# Patient Record
(demographics unavailable — no encounter records)

---

## 2024-10-07 NOTE — HISTORY OF PRESENT ILLNESS
[FreeTextEntry1] : 65 yearF here for assessment for Type 2 diabetes mellitus   last A1c: 7.4%   Patient with past medical history as below, remarkable for breast ca, WENDY, stage IV CKD, CHF, vulvar ca s/p vulvectomy and chemo-RT. Hx of renal calculi and hydronephrosis s/p lithotripsy      Screening Ophthalmology: follows Microalbumin: Cr:  follows with nephrology  EGFR: 18     Current diabetic medication regimen (verified with patient):   repaglinide 0.5mg with lunch/ dinner(largest meals) pioglitazone 30mg po daily   Patient was on ozempic, however unable to get due to insurance issues and had to be transitioned to the above      SMBG ranges (glucometer):  av-140 mg/dl whenever checked      Obesity  Unable to get GLP1Ra due to insurance, reports may change insurance in January   Patient is concerned about gradual generalized weight gain.    Follows a special diet: CHO restricted  Food Cravings: No Tried to lose weight before: Yes Tried any diet plans/ meal replacements for weight control: No Tried OTC or prescription medication for weight control: No     Ease of skin bruising: No Proximal muscle weakness: No Prior weight loss surgery: No    Known history of the following:   Thyroid disease: No Heart disease: No Mood disorder: yes Hypertension: No Seizures: No Gall bladder disease: No Known diabetic retinopathy: No  Pancreatitis: No Urolithiasis: yes 
No

## 2024-10-08 NOTE — PHYSICAL EXAM
[Normal Appearance] : normal appearance [Well Groomed] : well groomed [] : no respiratory distress [Skin Color & Pigmentation] : normal skin color and pigmentation [Affect] : the affect was normal [Mood] : the mood was normal

## 2024-10-08 NOTE — ASSESSMENT
[FreeTextEntry1] : changed program to Program 6, amp 1.1, vaginal sensory  will follow up in 2 weeks

## 2024-10-08 NOTE — HISTORY OF PRESENT ILLNESS
[FreeTextEntry1] : 66F with hx of breast cancer, sarcoidosis, CKD stage 4 (most recently 2.95 Nov 2023), kidney stones and Vulvar cancer, s/p surgery, chemo and radiation s/p 1/2024 botox with incomplete bladder emptying, pain with cysto as her urethra needed to be dilated over a wire  S/p SNM on 6/18/24 with early improvement, but more recent resumption of leakage   1 mo follow up Last visit was set to B, amp 1.3 mA, sens perineum Reports about 20% improvement  Today interrogated Set to Program 6, amp 1.1, sensory vaginal    OR 6/18/24 Electrode 0, toes 2.3 , Chaz, 3.0. tlch Electrode 1, toes 1.4, Chaz 1.2. Electrode 2, toes 0.5, Chaz 0.7. Electrode 3, toes 0.3, Chaz 1.0.

## 2024-11-05 NOTE — HISTORY OF PRESENT ILLNESS
[de-identified] : 65 y/o female with a hx of sarcoidosis and breast cancer in 2005 s/p lumpectomy w/ axillary dissection, chemotherapy, RT and tamoxifen/femara, s/p BSO in 2010 with negative pathology presents for a follow up visit.   MMG (2/11/20):  1. No radiographic evidence of malignancy and no significant radiographic change since the prior images.  2. Please see the ultrasound report below for additional information.  US (2/11/20): 1. Newly seen intraductal mass at the retroareolar right breast for which ultrasound-guided core  biopsy is recommended.  2. No sonographic evidence of malignancy on the left.  3. No sonographic finding at the 4:00 left breast where the patient described an area of pain to the  ultrasound technologist. Clinical follow-up is recommended.  BIARDS 4B: Suspicious Finding(s) - Moderate Suspicion for Malignancy  Pathology (2/14/20): Final Diagnosis 1. Right breast retroareolar core biopsy - Fragments of intraductal papilloma  FHx of breast cancer seen in mother  Medical Hx: DM, vulva cancer, oophorectomy, kidney stones, vulvectomy - tx under Dr. Multani  8/5/2020: Patient presents to the office for post-op Breast Exam S/P  Right breast lumpectomy on 7/23/20.  Pathology revealed  Intraductal papilloma with usual ductal hyperplasia reported on 7/28/20.   Patient had an  MR BREAST WAWIC BI W CAD On 7/31/20-, Post-op  Finding as follow: 1.  Status post excision of a papilloma in the right breast, with postsurgical change, as above. 2.  Stable 3 mm circumscribed T2 hyperintense enhancing focus in the upper outer right breast dating back to 7/13/2019. Breast MRI is recommended in one year to ensure continued stability of this finding. 3.  Status post left lumpectomy. No suspicious enhancement in the left breast. BI-RADS 3   ER/[place] (+), HER 2- (-),  poorly differentiated left breast cancer in 2005. She is s/p lumpectomy w/ axillary dissection, chemotherapy, RT and tamoxifen/femara, s/p BSO in 2010 with negative pathology.  S/P right breast lumpectomy on 7/23/20.  Pathology revealed Intraductal papilloma with usual ductal hyperplasia reported on 7/28/20.  MMG/US on 6/6/22-  No evidence of malignancy. BI-RADS 2  PET 10/6/22- Minimally FDG-avid asymmetric 1 cm soft tissue density, inferior RIGHT breast, not significantly changed in size and demonstrating new, minimal FDG avidity, and mildly FDG avid LEFT breast skin thickening, new since 2/3/2020, exam.are nonspecific.  S/p left breast lumpectomy on 1/12/23 pathology reveals DCIS, negative margins.  Most recent MMG/US on 6/10/2024 shows dense breasts, no evidence of malignancy. BI-RADS 2

## 2024-11-05 NOTE — HISTORY OF PRESENT ILLNESS
[de-identified] : 65 y/o female with a hx of sarcoidosis and breast cancer in 2005 s/p lumpectomy w/ axillary dissection, chemotherapy, RT and tamoxifen/femara, s/p BSO in 2010 with negative pathology presents for a follow up visit.   MMG (2/11/20):  1. No radiographic evidence of malignancy and no significant radiographic change since the prior images.  2. Please see the ultrasound report below for additional information.  US (2/11/20): 1. Newly seen intraductal mass at the retroareolar right breast for which ultrasound-guided core  biopsy is recommended.  2. No sonographic evidence of malignancy on the left.  3. No sonographic finding at the 4:00 left breast where the patient described an area of pain to the  ultrasound technologist. Clinical follow-up is recommended.  BIARDS 4B: Suspicious Finding(s) - Moderate Suspicion for Malignancy  Pathology (2/14/20): Final Diagnosis 1. Right breast retroareolar core biopsy - Fragments of intraductal papilloma  FHx of breast cancer seen in mother  Medical Hx: DM, vulva cancer, oophorectomy, kidney stones, vulvectomy - tx under Dr. Multani  8/5/2020: Patient presents to the office for post-op Breast Exam S/P  Right breast lumpectomy on 7/23/20.  Pathology revealed  Intraductal papilloma with usual ductal hyperplasia reported on 7/28/20.   Patient had an  MR BREAST WAWIC BI W CAD On 7/31/20-, Post-op  Finding as follow: 1.  Status post excision of a papilloma in the right breast, with postsurgical change, as above. 2.  Stable 3 mm circumscribed T2 hyperintense enhancing focus in the upper outer right breast dating back to 7/13/2019. Breast MRI is recommended in one year to ensure continued stability of this finding. 3.  Status post left lumpectomy. No suspicious enhancement in the left breast. BI-RADS 3   ER/[place] (+), HER 2- (-),  poorly differentiated left breast cancer in 2005. She is s/p lumpectomy w/ axillary dissection, chemotherapy, RT and tamoxifen/femara, s/p BSO in 2010 with negative pathology.  S/P right breast lumpectomy on 7/23/20.  Pathology revealed Intraductal papilloma with usual ductal hyperplasia reported on 7/28/20.  MMG/US on 6/6/22-  No evidence of malignancy. BI-RADS 2  PET 10/6/22- Minimally FDG-avid asymmetric 1 cm soft tissue density, inferior RIGHT breast, not significantly changed in size and demonstrating new, minimal FDG avidity, and mildly FDG avid LEFT breast skin thickening, new since 2/3/2020, exam.are nonspecific.  S/p left breast lumpectomy on 1/12/23 pathology reveals DCIS, negative margins.  Most recent MMG/US on 6/10/2024 shows dense breasts, no evidence of malignancy. BI-RADS 2

## 2024-11-05 NOTE — PHYSICAL EXAM
[Normal] : supple, no neck mass and thyroid not enlarged [Normal Neck Lymph Nodes] : normal neck lymph nodes  [Normal Supraclavicular Lymph Nodes] : normal supraclavicular lymph nodes [Normal Axillary Lymph Nodes] : normal axillary lymph nodes [Normal] : oriented to person, place and time, with appropriate affect [FreeTextEntry1] : SC was present [de-identified] : Normal S1,S2. Regular rate and rhythm  [de-identified] : Complete breast exam performed in supine and upright position. No palpable masses, tenderness, nipple discharge, inversion, deviation or enlarged axillary or supraclavicular lymph nodes bilaterally. Right breast chronic seroma. [de-identified] : Clear breath sounds bilaterally with normal respiratory effort.

## 2024-11-05 NOTE — PHYSICAL EXAM
[Normal] : supple, no neck mass and thyroid not enlarged [Normal Neck Lymph Nodes] : normal neck lymph nodes  [Normal Supraclavicular Lymph Nodes] : normal supraclavicular lymph nodes [Normal Axillary Lymph Nodes] : normal axillary lymph nodes [Normal] : oriented to person, place and time, with appropriate affect [FreeTextEntry1] : SC was present [de-identified] : Normal S1,S2. Regular rate and rhythm  [de-identified] : Complete breast exam performed in supine and upright position. No palpable masses, tenderness, nipple discharge, inversion, deviation or enlarged axillary or supraclavicular lymph nodes bilaterally. Right breast chronic seroma. [de-identified] : Clear breath sounds bilaterally with normal respiratory effort.

## 2024-11-05 NOTE — ASSESSMENT
[FreeTextEntry1] : Imp: S/P Right breast lumpectomy for Intraductal papilloma with usual ductal hyperplasia. No evidence of new or recurrent lesions. Breast imaging failed to identify any suspicious lesions on MR on 7/31/20 and showed continued stability of enhancing focus in the right breast for which continued follow up is advised.  [id number]/[id number] 2 PET 10/6/22- Minimally FDG-avid asymmetric 1 cm soft tissue density, inferior RIGHT breast, not significantly changed in size and demonstrating new, minimal FDG avidity, and mildly FDG avid LEFT breast skin thickening, new since 2/3/2020, exam.are nonspecific.  S/p left breast lumpectomy on 1/12/23 pathology reveals DCIS, negative margins.  MMG/US 6/2023- BI-RADS 2  Plan: MMG/US June 2024- ordered Return in 1 year   All medical entries were at my, Dr. Bertram Mancilla, direction. I have reviewed the chart and agree that the record accurately reflects my personal performance of the history, physical exam, assessment and plan. Our office Nurse Practitioner was present of the duration of the office visit.

## 2024-11-07 NOTE — PHYSICAL EXAM
[Normal] : well developed, well nourished, in no acute distress [] : no respiratory distress [Respiration, Rhythm And Depth] : normal respiratory rhythm and effort [Abdomen Soft] : soft [No Focal Deficits] : no focal deficits [Oriented To Time, Place, And Person] : oriented to person, place, and time [Affect] : the affect was normal

## 2024-11-07 NOTE — HISTORY OF PRESENT ILLNESS
[FreeTextEntry1] : Ms. Shelton is a 66-year-old woman seen today for follow-up visit. She was treated for [id number] vulvar cancer s/p partial radical vulvectomy, LVSI+, DOI 1.0 cm. 1 mm yanci-medial margin, 7 mm deep margin, and 1/10 inguinal LNs (+) 4 mm with TOMY, with local recurrence prior to RT, and then s/p 5940 RT to vulva and groins completed in January 2019 with clitoral lesions persisting through treatment, and subsequent radical anterior vulvectomy with primary closure 4/22/19.   She also has a history of high-grade DCIS of the left breast s/p lumpectomy in January 2023, DCISRT 9.2 with antecedent history of ipsilateral ER/[place] (+), HER 2- (-), poorly differentiated left breast cancer treated with Breast conserving surgery w/ axillary dissection, adjuvant chemotherapy and radiation therapy in 2005 followed by tamoxifen/femara. S/p BSO in 2010 with negative pathology.   4/17/24: She presents today for follow-up. She has a persistent cough, following with pulmonary and ENT. No breast pain. Denies abdominal or pelvic pain, diarrhea or constipation, vaginal bleeding or discharge. Main complaint is persistent urinary leakage, for which she wears Depends. No dysuria. She saw urology Dr Dumont and had recent cystoscopy.   5/8/2024: MRI pelvis: mixed T2 hypointense and intermediate signal of the left vulvar region with progressive enhancement, suggestive of posttreatment changes. In addition, there is an indeterminant T2 signal lesion distal to the vaginal introitus and near the distal urethra without diffusion restriction or abnormal enhancement. Findings felt to represent posttreatment changes.   6/3/2024- MRI Brain, MRA brain and neck for persistent headaches- no evidence of acute infarct, large vessel occlusion, or aneurysm. No hemodynamically significant stenosis of the bilateral cervical ICAs by NASCET criteria.   6/10/24 Mammo/sono: No evidence of malignancy. Bi-Rads 2.   11/7/24: She presents today for follow-up. Continues following with Dr. Mancilla, saw him earlier this week. She reports feeling a persistent lump in the left breast, unchanged for years, likely scar tissue. Next mammo/sono in June.  She also follows with Dr. Dumont for urinary incontinence, now with sacral neuromodulation implant device, which is helping somewhat. Still with urinary leakage, wears Depends.  No abdominal or pelvic pain, no vaginal bleeding, no diarrhea or constipation. Has not seen Dr. Gordillo.

## 2024-11-07 NOTE — HISTORY OF PRESENT ILLNESS
[FreeTextEntry1] : Ms. Shelton is a 66-year-old woman seen today for follow-up visit. She was treated for [id number] vulvar cancer s/p partial radical vulvectomy, LVSI+, DOI 1.0 cm. 1 mm yanci-medial margin, 7 mm deep margin, and 1/10 inguinal LNs (+) 4 mm with TOMY, with local recurrence prior to RT, and then s/p 5940 RT to vulva and groins completed in January 2019 with clitoral lesions persisting through treatment, and subsequent radical anterior vulvectomy with primary closure 4/22/19.   She also has a history of high-grade DCIS of the left breast s/p lumpectomy in January 2023, DCISRT 9.2 with antecedent history of ipsilateral ER/[place] (+), HER 2- (-), poorly differentiated left breast cancer treated with Breast conserving surgery w/ axillary dissection, adjuvant chemotherapy and radiation therapy in 2005 followed by tamoxifen/femara. S/p BSO in 2010 with negative pathology.   4/17/24: She presents today for follow-up. She has a persistent cough, following with pulmonary and ENT. No breast pain. Denies abdominal or pelvic pain, diarrhea or constipation, vaginal bleeding or discharge. Main complaint is persistent urinary leakage, for which she wears Depends. No dysuria. She saw urology Dr Dumont and had recent cystoscopy.   5/8/2024: MRI pelvis: mixed T2 hypointense and intermediate signal of the left vulvar region with progressive enhancement, suggestive of posttreatment changes. In addition, there is an indeterminant T2 signal lesion distal to the vaginal introitus and near the distal urethra without diffusion restriction or abnormal enhancement. Findings felt to represent posttreatment changes.   6/3/2024- MRI Brain, MRA brain and neck for persistent headaches- no evidence of acute infarct, large vessel occlusion, or aneurysm. No hemodynamically significant stenosis of the bilateral cervical ICAs by NASCET criteria.   6/10/24 Mammo/sono: No evidence of malignancy. Bi-Rads 2.   11/7/24: She presents today for follow-up. Continues following with Dr. Mancilla, saw him earlier this week. She reports feeling a persistent lump in the left breast, unchanged for years, likely scar tissue. Next mammo/sono in June.  She also follows with Dr. Dumont for urinary incontinence, now with sacral neuromodulation implant device, which is helping somewhat. Still with urinary leakage, wears Depends.  No abdominal or pelvic pain, no vaginal bleeding, no diarrhea or constipation. Has not seen Dr. Gordillo.

## 2024-11-07 NOTE — REVIEW OF SYSTEMS
[Constipation: Grade 0] : Constipation: Grade 0 [Diarrhea: Grade 0] : Diarrhea: Grade 0 [Nausea: Grade 0] : Nausea: Grade 0 [Fatigue: Grade 1 - Fatigue relieved by rest] : Fatigue: Grade 1 - Fatigue relieved by rest [Hematuria: Grade 0] : Hematuria: Grade 0 [Urinary Incontinence: Grade 3 - Intervention indicated (e.g., clamp, collagen injections); operative intervention indicated; limiting self care ADL] : Urinary Incontinence: Grade 3 - Intervention indicated (e.g., clamp, collagen injections); operative intervention indicated; limiting self care ADL [Urinary Retention: Grade 0] : Urinary Retention: Grade 0 [Urinary Tract Pain: Grade 0] : Urinary Tract Pain: Grade 0 [Breast Pain: Grade 0] : Breast Pain: Grade 0

## 2025-01-27 NOTE — HISTORY OF PRESENT ILLNESS
[FreeTextEntry1] : f/up for Type 2 diabetes mellitus   last A1c: 5.7%   Patient with past medical history as below, remarkable for breast ca, WENDY, stage IV CKD, CHF, vulvar ca s/p vulvectomy and chemo-RT. Hx of renal calculi and hydronephrosis s/p lithotripsy      Screening Ophthalmology: follows Microalbumin: Cr:  follows with nephrology  EGFR: 18     Current diabetic medication regimen (verified with patient):   repaglinide 0.5mg with lunch/ dinner(largest meals) pioglitazone 30mg po daily    Patient was on ozempic, however unable to get due to insurance issues and had to be transitioned to the above. Currently requesting ozempic again     SMBG ranges (glucometer): Needs testing supplies, reports insurance and coverage issues      Obesity  Unable to get GLP1Ra due to insurance prior, reports insurance changed and is requesting ozempic   Patient is concerned about gradual generalized weight gain.    Follows a special diet: CHO restricted  Food Cravings: No Tried to lose weight before: Yes Tried any diet plans/ meal replacements for weight control: No Tried OTC or prescription medication for weight control: No     Ease of skin bruising: No Proximal muscle weakness: No Prior weight loss surgery: No    Known history of the following:   Thyroid disease: No Heart disease: No Mood disorder: yes Hypertension: No Seizures: No Gall bladder disease: No Known diabetic retinopathy: No  Pancreatitis: No Urolithiasis: yes

## 2025-02-06 NOTE — HISTORY OF PRESENT ILLNESS
[FreeTextEntry1] : Ms. Shelton is a 67 y/o female with PMhx DM, hyperlipidemia, breast cancer, sarcoidosis, vulvar carcinoma and CKD4 presenting today for follow up.   She was diagnosed with vulvar Ca in Sep /2018 and underwent a partial radical vulvectomy in October. She received 6 cycles of chemo ( Cisplatin) and 37 cycles of radiation in Dec/early Jan. Underwent radical vulvectomy with closure in april 2019. Also, with H/O BRCA1+ ER/[place] (+), HER 2- (-) poorly differentiated left breast cancer in 2005, s/p lumpectomy w/ axillary dissection, chemotherapy, RT and tamoxifen/femara, s/p BSO in 2010 with negative pathology.    Early in 2020, she had JONI in the setting of a stone and hydronephrosis. She underwent Lithotripsy with return of her creatinine to the 2 mg/dl range. She was admitted to [facility] in June of 2020 with pyelopnephriItis( E Coli) , JONI and severe anemia. Her peak creatinine during this admission was 4.5 mg/dl and was down to 2.5 mg/dl at the time of discharge. Of note, a ct scan done during this time revealed an atrophic Left kidney.  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 No further treatment ie lumpectomy/radiation etc have been pursued so far.   She was hospitalized in February (2023)for a nervous breakdown due to her cancer diagnosis. Her serum creatinine during this time ranged between 2.6-3.3 mg/dl. Readmitted again with SOB attributed to volume overload, which improved with diuresis. She underwent a CT chest which revealed pulmonary edema and trace bilateral pleural effusions. She also underwent a repeat TTE which showed moderate  diastolic dysfunction ( stage 11). Normal LV/RV  systolic function , EF 56%. DC on home O2. . Her kidney function during this time fluctuated. Serum creatinine in march has ranged between 3.1-3.8 mg/dl.   She underwent a renal sono in the hospital: Right kidney: 9.6 cm. No renal mass, hydronephrosis or calculi. Left kidney: 8.6 cm. No renal mass, hydronephrosis or calculi., Urinary bladder: Within normal limits.  6/2024: She comes for a follow up visit today. Bladder botox not helpful, still with urinary incontinence. Now taking diuretics BID, which has improved her LE edema. BP has been good. Still with postural dizziness. Planning for a urologic procedure next week for incontinence. Was canceled prior due to hyperglycemia. Saw endocrine who started her on Ozempic and Januvia (stated Tradjenta was too expensive). Did lose some weight with ozempic. Last A1c ~8%. Obtained cardiology clearance as well, had 2D echo in May that was normal. sees a psychiatrist once a month and therapy 2 times a week   9/18/24: Presents for follow-up with her . Lost insurance drug coverage so has stopped taking Ozempic for the past couple months and also decreased Ethacrynic acid to once daily to ration her supply. She stated she is otherwise doing well. Had procedure for urinary incontinence which has helped a bit.  2/2025: Since last visit: She has been doing well, denies acute illness or hospitalizations  Urinary incontinence has improved with implantable device  Stopped ethacrynic acid Home -130s - she has been taking torsemide 20, amlodipine 10, propranolol, tamsulosin (?) She has BLE edema, denies dyspnea, orthopnea, abd pain, n/v/d, dysuria, hematuria, rash; continues to have episodes of dizziness Ozempic - stopped while sorting out insurance coverage  On pioglitazone

## 2025-02-06 NOTE — HISTORY OF PRESENT ILLNESS
[FreeTextEntry1] : Ms. Shelton is a 65 y/o female with PMhx DM, hyperlipidemia, breast cancer, sarcoidosis, vulvar carcinoma and CKD4 presenting today for follow up.   She was diagnosed with vulvar Ca in Sep /2018 and underwent a partial radical vulvectomy in October. She received 6 cycles of chemo ( Cisplatin) and 37 cycles of radiation in Dec/early Jan. Underwent radical vulvectomy with closure in april 2019. Also, with H/O BRCA1+ ER/CT (+), HER 2- (-) poorly differentiated left breast cancer in 2005, s/p lumpectomy w/ axillary dissection, chemotherapy, RT and tamoxifen/femara, s/p BSO in 2010 with negative pathology.    Early in 2020, she had JONI in the setting of a stone and hydronephrosis. She underwent Lithotripsy with return of her creatinine to the 2 mg/dl range. She was admitted to [facility] in June of 2020 with pyelopnephriItis( E Coli) , JONI and severe anemia. Her peak creatinine during this admission was 4.5 mg/dl and was down to 2.5 mg/dl at the time of discharge. Of note, a ct scan done during this time revealed an atrophic Left kidney.  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 No further treatment ie lumpectomy/radiation etc have been pursued so far.   She was hospitalized in February (2023)for a nervous breakdown due to her cancer diagnosis. Her serum creatinine during this time ranged between 2.6-3.3 mg/dl. Readmitted again with SOB attributed to volume overload, which improved with diuresis. She underwent a CT chest which revealed pulmonary edema and trace bilateral pleural effusions. She also underwent a repeat TTE which showed moderate  diastolic dysfunction ( stage 11). Normal LV/RV  systolic function , EF 56%. DC on home O2. . Her kidney function during this time fluctuated. Serum creatinine in march has ranged between 3.1-3.8 mg/dl.   She underwent a renal sono in the hospital: Right kidney: 9.6 cm. No renal mass, hydronephrosis or calculi. Left kidney: 8.6 cm. No renal mass, hydronephrosis or calculi., Urinary bladder: Within normal limits.  6/2024: She comes for a follow up visit today. Bladder botox not helpful, still with urinary incontinence. Now taking diuretics BID, which has improved her LE edema. BP has been good. Still with postural dizziness. Planning for a urologic procedure next week for incontinence. Was canceled prior due to hyperglycemia. Saw endocrine who started her on Ozempic and Januvia (stated Tradjenta was too expensive). Did lose some weight with ozempic. Last A1c ~8%. Obtained cardiology clearance as well, had 2D echo in May that was normal. sees a psychiatrist once a month and therapy 2 times a week   9/18/24: Presents for follow-up with her . Lost insurance drug coverage so has stopped taking Ozempic for the past couple months and also decreased Ethacrynic acid to once daily to ration her supply. She stated she is otherwise doing well. Had procedure for urinary incontinence which has helped a bit.  2/2025: Since last visit: She has been doing well, denies acute illness or hospitalizations  Urinary incontinence has improved with implantable device  Stopped ethacrynic acid Home -130s - she has been taking torsemide 20, amlodipine 10, propranolol, tamsulosin (?) She has BLE edema, denies dyspnea, orthopnea, abd pain, n/v/d, dysuria, hematuria, rash; continues to have episodes of dizziness Ozempic - stopped while sorting out insurance coverage  On pioglitazone

## 2025-02-06 NOTE — HISTORY OF PRESENT ILLNESS
[FreeTextEntry1] : Ms. Shelton is a 65 y/o female with PMhx DM, hyperlipidemia, breast cancer, sarcoidosis, vulvar carcinoma and CKD4 presenting today for follow up.   She was diagnosed with vulvar Ca in Sep /2018 and underwent a partial radical vulvectomy in October. She received 6 cycles of chemo ( Cisplatin) and 37 cycles of radiation in Dec/early Jan. Underwent radical vulvectomy with closure in april 2019. Also, with H/O BRCA1+ ER/NV (+), HER 2- (-) poorly differentiated left breast cancer in 2005, s/p lumpectomy w/ axillary dissection, chemotherapy, RT and tamoxifen/femara, s/p BSO in 2010 with negative pathology.    Early in 2020, she had JONI in the setting of a stone and hydronephrosis. She underwent Lithotripsy with return of her creatinine to the 2 mg/dl range. She was admitted to [facility] in June of 2020 with pyelopnephriItis( E Coli) , JONI and severe anemia. Her peak creatinine during this admission was 4.5 mg/dl and was down to 2.5 mg/dl at the time of discharge. Of note, a ct scan done during this time revealed an atrophic Left kidney.  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 No further treatment ie lumpectomy/radiation etc have been pursued so far.   She was hospitalized in February (2023)for a nervous breakdown due to her cancer diagnosis. Her serum creatinine during this time ranged between 2.6-3.3 mg/dl. Readmitted again with SOB attributed to volume overload, which improved with diuresis. She underwent a CT chest which revealed pulmonary edema and trace bilateral pleural effusions. She also underwent a repeat TTE which showed moderate  diastolic dysfunction ( stage 11). Normal LV/RV  systolic function , EF 56%. DC on home O2. . Her kidney function during this time fluctuated. Serum creatinine in march has ranged between 3.1-3.8 mg/dl.   She underwent a renal sono in the hospital: Right kidney: 9.6 cm. No renal mass, hydronephrosis or calculi. Left kidney: 8.6 cm. No renal mass, hydronephrosis or calculi., Urinary bladder: Within normal limits.  6/2024: She comes for a follow up visit today. Bladder botox not helpful, still with urinary incontinence. Now taking diuretics BID, which has improved her LE edema. BP has been good. Still with postural dizziness. Planning for a urologic procedure next week for incontinence. Was canceled prior due to hyperglycemia. Saw endocrine who started her on Ozempic and Januvia (stated Tradjenta was too expensive). Did lose some weight with ozempic. Last A1c ~8%. Obtained cardiology clearance as well, had 2D echo in May that was normal. sees a psychiatrist once a month and therapy 2 times a week   9/18/24: Presents for follow-up with her . Lost insurance drug coverage so has stopped taking Ozempic for the past couple months and also decreased Ethacrynic acid to once daily to ration her supply. She stated she is otherwise doing well. Had procedure for urinary incontinence which has helped a bit.  2/2025: Since last visit: She has been doing well, denies acute illness or hospitalizations  Urinary incontinence has improved with implantable device  Stopped ethacrynic acid Home -130s - she has been taking torsemide 20, amlodipine 10, propranolol, tamsulosin (?) She has BLE edema, denies dyspnea, orthopnea, abd pain, n/v/d, dysuria, hematuria, rash; continues to have episodes of dizziness Ozempic - stopped while sorting out insurance coverage  On pioglitazone

## 2025-02-06 NOTE — HISTORY OF PRESENT ILLNESS
[FreeTextEntry1] : Ms. Shelton is a 65 y/o female with PMhx DM, hyperlipidemia, breast cancer, sarcoidosis, vulvar carcinoma and CKD4 presenting today for follow up.   She was diagnosed with vulvar Ca in Sep /2018 and underwent a partial radical vulvectomy in October. She received 6 cycles of chemo ( Cisplatin) and 37 cycles of radiation in Dec/early Jan. Underwent radical vulvectomy with closure in april 2019. Also, with H/O BRCA1+ ER/KS (+), HER 2- (-) poorly differentiated left breast cancer in 2005, s/p lumpectomy w/ axillary dissection, chemotherapy, RT and tamoxifen/femara, s/p BSO in 2010 with negative pathology.    Early in 2020, she had JONI in the setting of a stone and hydronephrosis. She underwent Lithotripsy with return of her creatinine to the 2 mg/dl range. She was admitted to [facility] in June of 2020 with pyelopnephriItis( E Coli) , JONI and severe anemia. Her peak creatinine during this admission was 4.5 mg/dl and was down to 2.5 mg/dl at the time of discharge. Of note, a ct scan done during this time revealed an atrophic Left kidney.  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 No further treatment ie lumpectomy/radiation etc have been pursued so far.   She was hospitalized in February (2023)for a nervous breakdown due to her cancer diagnosis. Her serum creatinine during this time ranged between 2.6-3.3 mg/dl. Readmitted again with SOB attributed to volume overload, which improved with diuresis. She underwent a CT chest which revealed pulmonary edema and trace bilateral pleural effusions. She also underwent a repeat TTE which showed moderate  diastolic dysfunction ( stage 11). Normal LV/RV  systolic function , EF 56%. DC on home O2. . Her kidney function during this time fluctuated. Serum creatinine in march has ranged between 3.1-3.8 mg/dl.   She underwent a renal sono in the hospital: Right kidney: 9.6 cm. No renal mass, hydronephrosis or calculi. Left kidney: 8.6 cm. No renal mass, hydronephrosis or calculi., Urinary bladder: Within normal limits.  6/2024: She comes for a follow up visit today. Bladder botox not helpful, still with urinary incontinence. Now taking diuretics BID, which has improved her LE edema. BP has been good. Still with postural dizziness. Planning for a urologic procedure next week for incontinence. Was canceled prior due to hyperglycemia. Saw endocrine who started her on Ozempic and Januvia (stated Tradjenta was too expensive). Did lose some weight with ozempic. Last A1c ~8%. Obtained cardiology clearance as well, had 2D echo in May that was normal. sees a psychiatrist once a month and therapy 2 times a week   9/18/24: Presents for follow-up with her . Lost insurance drug coverage so has stopped taking Ozempic for the past couple months and also decreased Ethacrynic acid to once daily to ration her supply. She stated she is otherwise doing well. Had procedure for urinary incontinence which has helped a bit.  2/2025: Since last visit: She has been doing well, denies acute illness or hospitalizations  Urinary incontinence has improved with implantable device  Stopped ethacrynic acid Home -130s - she has been taking torsemide 20, amlodipine 10, propranolol, tamsulosin (?) She has BLE edema, denies dyspnea, orthopnea, abd pain, n/v/d, dysuria, hematuria, rash; continues to have episodes of dizziness Ozempic - stopped while sorting out insurance coverage  On pioglitazone

## 2025-02-06 NOTE — ASSESSMENT
[FreeTextEntry1] : Ms Shelton is a 67 y/o woman with remote H/O sarcoidosis, breast cancer, diagnosed Vulvar cancer in 2018 s/p vulvectomy/Chemotherapy ( Cisplatin) /Radiation, recently diagnosed left Breast Ca /2023( DCIS) s/p lumpectomy, Diastolic dysfunction, DM and CKD here for follow up  1. CKD: Stage 4 CKD with superimposed JONI with left atrophic kidney. Her CKD is likely as a result of tubular toxicity from cisplatin that she received in 2019, with possibly a component from NSAIDS. Sarcoidosis can also lead to chronic tubulointerstitial nephritis however it seems less likely as she is in remission from Sarcoid. US duplex negative for CELI. She had a few hospitalizations in 2023 with fluid overload requiring diuresis, found to have diastolic dysfunction but most recent echo in May was normal. Cr had increased to 3.4 in 5/2024, unclear etiology, but has since settled back at her baseline ~ 2.8 (9/2024). - Repeat renal labs, UA, uPCR - Check CBC, PTH, vit D, phos - Knows to avoid NSAIDs -Check renal sono  2. HTN: BP 140s is suboptimal but acceptable for now as home BP is 130-140s. We think she is taking torsemide, not ethacrynic acid, as well as amlodipine 10, propranolol. - I have asked her to bring a list of her active meds or med bottles with her to clinic visits as she does not fully remember what she takes  - continue with torsemide, amlodipine for now - advised low sodium diet -Will d/w Endocrine re pioglitazone  3. Anemia: Hgb 11.4 is at goal, iron stores are replete. - Continue to monitor Hgb and iron stores periodically.   4. BMD: Historically with secondary hyperparathyroidism, Vit d replete - Maintain calcitriol 0.25 mcg 5 days a week -repeat labs  RTC in 4 months.

## 2025-02-06 NOTE — REVIEW OF SYSTEMS
[Recent Weight Gain (___ Lbs)] : recent [unfilled] ~Ulb weight gain [Lower Ext Edema] : lower extremity edema [Incontinence] : incontinence [Anxiety] : anxiety [Negative] : Neurological [Dizziness] : dizziness [Fever] : no fever [Chills] : no chills [Feeling Poorly] : not feeling poorly [Feeling Tired] : not feeling tired [Heart Rate Is Slow] : the heart rate was not slow [Heart Rate Is Fast] : the heart rate was not fast [Feelings Of Weakness] : no feelings of weakness [Easy Bleeding] : no tendency for easy bleeding [Easy Bruising] : no tendency for easy bruising [FreeTextEntry3] : allergies [FreeTextEntry8] : Improved

## 2025-02-06 NOTE — PHYSICAL EXAM
[General Appearance - Alert] : alert [General Appearance - In No Acute Distress] : in no acute distress [General Appearance - Well Nourished] : well nourished [Neck Cervical Mass (___cm)] : no neck mass was observed [Jugular Venous Distention Increased] : there was no jugular-venous distention [Respiration, Rhythm And Depth] : normal respiratory rhythm and effort [] : no respiratory distress [Exaggerated Use Of Accessory Muscles For Inspiration] : no accessory muscle use [Auscultation Breath Sounds / Voice Sounds] : lungs were clear to auscultation bilaterally [Heart Rate And Rhythm] : heart rate was normal and rhythm regular [Heart Sounds] : normal S1 and S2 [Heart Sounds Gallop] : no gallops [Murmurs] : no murmurs [Heart Sounds Pericardial Friction Rub] : no pericardial rub [Abdomen Soft] : soft [Abdomen Tenderness] : non-tender [No CVA Tenderness] : no ~M costovertebral angle tenderness [Abnormal Walk] : normal gait [Involuntary Movements] : no involuntary movements were seen [Skin Color & Pigmentation] : normal skin color and pigmentation [No Focal Deficits] : no focal deficits [Oriented To Time, Place, And Person] : oriented to person, place, and time [Impaired Insight] : insight and judgment were intact [Affect] : the affect was normal [Mood] : the mood was normal [FreeTextEntry1] : ++edema 2+ BLE edema

## 2025-02-06 NOTE — END OF VISIT
[] : Fellow [Time Spent: ___ minutes] : I have spent [unfilled] minutes of time on the encounter which excludes teaching and separately reported services. [FreeTextEntry3] : Per Dr. Pandey's note

## 2025-02-06 NOTE — PHYSICAL EXAM
[General Appearance - Alert] : alert [General Appearance - In No Acute Distress] : in no acute distress [General Appearance - Well Nourished] : well nourished [Neck Cervical Mass (___cm)] : no neck mass was observed [Jugular Venous Distention Increased] : there was no jugular-venous distention [] : no respiratory distress [Respiration, Rhythm And Depth] : normal respiratory rhythm and effort [Exaggerated Use Of Accessory Muscles For Inspiration] : no accessory muscle use [Auscultation Breath Sounds / Voice Sounds] : lungs were clear to auscultation bilaterally [Heart Rate And Rhythm] : heart rate was normal and rhythm regular [Heart Sounds] : normal S1 and S2 [Heart Sounds Gallop] : no gallops [Murmurs] : no murmurs [Heart Sounds Pericardial Friction Rub] : no pericardial rub [Abdomen Soft] : soft [Abdomen Tenderness] : non-tender [No CVA Tenderness] : no ~M costovertebral angle tenderness [Abnormal Walk] : normal gait [Involuntary Movements] : no involuntary movements were seen [Skin Color & Pigmentation] : normal skin color and pigmentation [No Focal Deficits] : no focal deficits [Oriented To Time, Place, And Person] : oriented to person, place, and time [Impaired Insight] : insight and judgment were intact [Affect] : the affect was normal [Mood] : the mood was normal [FreeTextEntry1] : ++edema 2+ BLE edema

## 2025-02-06 NOTE — ASSESSMENT
Derek 28.1    +FM - especially on the right side  No lof, no vb  No ctx       EDC by 8w0d US   - NIPT & Carrier discussed- declined  - msAFP: declined  - L2US nl   - CBC, 1 hour GTT, syphillis and HIV testing all ordered at prior appt   -advise her to have 1 hr GTT & CBC done between 24-28 wks and for 3rd tri HIV & T pal done at 28 wks. - plans to do it this week   -tdap discussed - ask at next visit      Obesity (pre preg BMI 33)   -nl L2US  - follow up growth w BPP at 32 weeks - 2025  -A1c: 5.6 on , CMP normal   -limit wt gain 11-20 lbs      AMA at time of delivery  - see  testing above      H/o oligohydramnios   -IOL at 41 wks for low fluid   -she was scared of IOL, wanted to wait as long as possible.      PFPT and low back pain  [ ] pelvic PT order placed    Cconstipation   Lifestyle recs given  Miralax   Rectal suppository to pharmacy    [FreeTextEntry1] : Ms Shelton is a 67 y/o woman with remote H/O sarcoidosis, breast cancer, diagnosed Vulvar cancer in 2018 s/p vulvectomy/Chemotherapy ( Cisplatin) /Radiation, recently diagnosed left Breast Ca /2023( DCIS) s/p lumpectomy, Diastolic dysfunction, DM and CKD here for follow up  1. CKD: Stage 4 CKD with superimposed JONI with left atrophic kidney. Her CKD is likely as a result of tubular toxicity from cisplatin that she received in 2019, with possibly a component from NSAIDS. Sarcoidosis can also lead to chronic tubulointerstitial nephritis however it seems less likely as she is in remission from Sarcoid. US duplex negative for CELI. She had a few hospitalizations in 2023 with fluid overload requiring diuresis, found to have diastolic dysfunction but most recent echo in May was normal. Cr had increased to 3.4 in 5/2024, unclear etiology, but has since settled back at her baseline ~ 2.8 (9/2024). - Repeat renal labs, UA, uPCR - Check CBC, PTH, vit D, phos - Knows to avoid NSAIDs -Check renal sono  2. HTN: BP 140s is suboptimal but acceptable for now as home BP is 130-140s. We think she is taking torsemide, not ethacrynic acid, as well as amlodipine 10, propranolol. - I have asked her to bring a list of her active meds or med bottles with her to clinic visits as she does not fully remember what she takes  - continue with torsemide, amlodipine for now - advised low sodium diet -Will d/w Endocrine re pioglitazone  3. Anemia: Hgb 11.4 is at goal, iron stores are replete. - Continue to monitor Hgb and iron stores periodically.   4. BMD: Historically with secondary hyperparathyroidism, Vit d replete - Maintain calcitriol 0.25 mcg 5 days a week -repeat labs  RTC in 4 months.

## 2025-03-12 NOTE — HISTORY OF PRESENT ILLNESS
[FreeTextEntry1] : f/u chronic issues [de-identified] : 1) saw nephro, got u/s of kidneys   2) had some vision disturbances- called neuro, got MRI, pending apptment with movement disorder specialist, no further episoides   3) still having headaches- coffee helps, soda helps  4) getting dizziness when she stands up, otherwise okay. no associated CP, palpitations, headaches

## 2025-03-12 NOTE — HISTORY OF PRESENT ILLNESS
[FreeTextEntry1] : f/u chronic issues [de-identified] : 1) saw nephro, got u/s of kidneys   2) had some vision disturbances- called neuro, got MRI, pending apptment with movement disorder specialist, no further episoides   3) still having headaches- coffee helps, soda helps  4) getting dizziness when she stands up, otherwise okay. no associated CP, palpitations, headaches

## 2025-03-12 NOTE — HISTORY OF PRESENT ILLNESS
[FreeTextEntry1] : f/u chronic issues [de-identified] : 1) saw nephro, got u/s of kidneys   2) had some vision disturbances- called neuro, got MRI, pending apptment with movement disorder specialist, no further episoides   3) still having headaches- coffee helps, soda helps  4) getting dizziness when she stands up, otherwise okay. no associated CP, palpitations, headaches

## 2025-03-12 NOTE — ASSESSMENT
[FreeTextEntry1] : I spent a total of 35mins on the care of this patient on date of service including reviewing records, obtaining a history and conducting a physical examination; as well as counseling and educating the patient.

## 2025-03-12 NOTE — PHYSICAL EXAM
-- Message is from the [facility]--    Reason for Call: Patient is calling in regards to a missed call. Can you please call back and assist please. Thank you     Caller Information       Type Contact Phone    08/22/2019 02:49 PM Phone (Incoming) Taiwo Neumann (Self) [phone] (H)          Alternative phone number: NA    Turnaround time given to caller:   \"This message will be sent to [state Provider's name]. The clinical team will fulfill your request as soon as they review your message.\"     [No Acute Distress] : no acute distress [No Respiratory Distress] : no respiratory distress  [Normal Rate] : normal rate  [Normal Affect] : the affect was normal [Normal Insight/Judgement] : insight and judgment were intact

## 2025-03-21 NOTE — DISCUSSION/SUMMARY
[FreeTextEntry1] : 66F with PMhx DM, hyperlipidemia, breast cancer, sarcoidosis, vulvar carcinoma and CKD4 who has mild essential tremor responsive to BB. Tremors at this time are not bothersome to her and would monitor on inderal for now.  Chronic  dizziness is of unknown etiology and has chronic daily headaches.   Gait disorder has component of sensory ataxia (dorsal column signs present) along with progression of atrophy of the cerebellum on review of MRIs from 2021 to 2024.   Patient was counseled on the following recommendations: Obtain MRI c/spine r/o myelopathy check copper, Vit E, b12, FMR1 mut consider referral to headache team f/u in 6 months

## 2025-03-21 NOTE — HISTORY OF PRESENT ILLNESS
[FreeTextEntry1] : Ms. Shelton is a 64 y/o female with PMhx DM, hyperlipidemia, breast cancer, sarcoidosis, vulvar carcinoma and CKD4 who is referred for evaluation of ET.   She was diagnosed with vulvar Ca in Sep /2018 and underwent a partial radical vulvectomy in October. She received 6 cycles of chemo ( Cisplatin) and 37 cycles of radiation in Dec/early Flo. Underwent radical vulvectomy with closure in april 2019. Also, with H/O BRCA1+ ER/NV (+), HER 2- (-) poorly differentiated left breast cancer in 2005, s/p lumpectomy w/ axillary dissection, chemotherapy, RT and tamoxifen/femara, s/p BSO in 2010 with negative pathology.  Developed tremors in the last 5 years that induced when she moves her hands primarily. She had a mental breakdown a couple of years ago and was hospitalized at the time. Olanzapine 5mg hs was started then and has not changed. Her tremors reportedly improved with addition of inderal ER. They currently do not limit her abilities to do activities or ADLs. She is estranged from her family, but says she is not aware of a FH of tremors. She has also had an unsteady gait which she attributes to chonic feeling of dizziness. Has had negative cardiac workup and MRA of the head and neck have been unremarkable. Several MRIs of the brain in the last couple of years have not shown acute pathology but rather microvascular changes and mild CBL atrophy along with cortical atrophy. She has numbness in her feet throughout the day which she says relates to her DMII and chemo treatments.   She also has chronic daily headaches since 2022 that is most bothersome symptom.   Dr. Eleanor Lemus is her PCP/cardiologist

## 2025-03-21 NOTE — PHYSICAL EXAM
[Cranial Nerves Oculomotor (III)] : extraocular motion intact [Cranial Nerves Facial (VII)] : face symmetrical [Motor Strength] : muscle strength was normal in all four extremities [Sensation Tactile Decrease] : light touch was intact [Proprioception] : proprioception was intact [2+] : Patella left 2+ [0] : Ankle jerk left 0 [FreeTextEntry1] : There is tongue popping and jaw deviation at times when speaking. She has mild hypomimia. There is no resting tremor. There is mild postural hand tremors with mild KT L>R. There is no sequencing effect on Negar and muscle tone is normal. Her handwriting is legible and spirals have slight waveforms. She walks with wide base of station. Sways mildly on rhomberg testing.  [FreeTextEntry7] : reduced vibration in the feet

## 2025-03-25 NOTE — ASSESSMENT
[FreeTextEntry1] : This is a case of a 66 yr right handed female with PMH of anxiety, chronic daily headaches, CHF, CKD stage 4, DM 2, DCIS left breast, HLD, HTN, vertigo, WENDY, presents today with headaches. chronic daily ha vs migraines failed propranolol.  Pt also with hx of CHF and CKD stage 4.  Would benefit from botox Can consider emgality  Plan:   {   } consider botox vs emgality- pt will think about options and call   {   } f/u with cardio/nephro   Headache education provided: [] Stay well hydrated [] Limit excessive caffeine and alcohol intake [] Maintain good sleep hygiene [] Try to avoid triggers [] Practice good eating habits [] Avoid excessive use of over the counter pain medications, as they can cause medication overuse headaches  [] Keep a headache diary [] Relaxation techniques, biofeedback, massage therapy, acupunctures, and heating pads may be effective  The above plan was discussed with Amber Shelton in great detail. Leonidmac Niryuri verbalized understanding and agrees with plan as detailed above. Patient was provided education and counselling on current diagnosis/symptoms. She was advised to call our clinic at [phone] for any new or worsening symptoms, or with any questions or concerns. In case of acute onset of neurological symptoms or worsening presentation, patient was advised to present to nearest emergency room for further evaluation. Amber Niryuri expressed understanding and all her questions/concerns were addressed.

## 2025-03-25 NOTE — HISTORY OF PRESENT ILLNESS
[FreeTextEntry1] : This is a case of a 66 yr right handed female with PMH of anxiety, chronic daily headaches, CHF, CKD stage 4, DM 2, DCIS left breast, HLD, HTN, vertigo, WENDY, presents today with headaches.  Pt has had a history of headaches occasionally when she was younger. Chronic headaches started about 5 yrs.  Pt reports she was under a lot of stress after vulvar ca and that is when she noticed headaches started. She was seeing Jose Ramon Christopher 2021 to 2024.  Had imaging including MRI head w/wo and MRA H/N in 2024.  Patient MRI of the head shows atrophic changes that may be treatment related. No abnormal enhancement. The MRA of the head showed focus of abnormal flow signal in the right M1 region measuring 1.9 x 1 mm.  She reports headaches are daily. Last all day.    Located right frontal and left occipital.  Had nausea wit headaches in the beginning but not as frequent.  Denies photophobia and phonophobia.   Jason visual complaints, doubled, blurred.   She f/u with olpth - normal exam.  Denies visual auras.  Does chronic neck pain.  Denies trauma.  Does report dizziness.  Denies extremity weakness or slurred speech.  Denies tingling in face.  Treat headaches with nothing.  Has used Tylenol in the past which she thinks helped.   Triggers: stress  Symptoms: headache, chronic headache, nausea, neck pain , dizziness  Headache Days/Month: Prior to Botox tx: 30 Headache Hours/Day: Prior to Botox tx: >24 hrs  Prior meds include: propranolol   Cardio: [facility]  hx of: vulvar Ca in Sep /2018 and underwent a partial radical vulvectomy in October. She received 6 cycles of chemo ( Cisplatin) and 37 cycles of radiation in Dec/early Flo. Underwent radical vulvectomy with closure in april 2019. Also, with H/O BRCA1+ ER/MI (+), HER 2- (-) poorly differentiated left breast cancer in 2005, s/p lumpectomy w/ axillary dissection, chemotherapy, RT and tamoxifen/femara, s/p BSO in 2010 with negative pathology   Occupation: retired -  sleep: wears cpap  Allergies: PCN, Sulfur, latex

## 2025-03-25 NOTE — PHYSICAL EXAM
[General Appearance - Alert] : alert [General Appearance - Well Nourished] : well nourished [Oriented To Time, Place, And Person] : oriented to person, place, and time [Affect] : the affect was normal [Person] : oriented to person [Place] : oriented to place [Motor Tone] : muscle tone was normal in all four extremities [Cranial Nerves Facial (VII)] : face symmetrical [Motor Strength] : muscle strength was normal in all four extremities [2+] : Biceps left 2+ [Abnormal Walk] : normal gait [Skin Color & Pigmentation] : normal skin color and pigmentation

## 2025-04-07 NOTE — PHYSICAL EXAM
[General Appearance - Well Developed] : well developed [General Appearance - Well Nourished] : well nourished [Normal Conjunctiva] : the conjunctiva exhibited no abnormalities [Normal Oropharynx] : normal oropharynx [IV] : IV [Neck Appearance] : the appearance of the neck was normal [Heart Rate And Rhythm] : heart rate was normal and rhythm regular [] : no respiratory distress [Respiration, Rhythm And Depth] : normal respiratory rhythm and effort [Abnormal Walk] : normal gait [Musculoskeletal - Swelling] : no joint swelling seen [Skin Color & Pigmentation] : normal skin color and pigmentation [Skin Turgor] : normal skin turgor [Oriented To Time, Place, And Person] : oriented to person, place, and time

## 2025-04-07 NOTE — ASSESSMENT
[FreeTextEntry1] : This is a 66 year old female with severe WENDY/OHS on bilevel here for a follow up visit. Therapeutic and compliance data download reveals usage 90% of days with an average use of 8 hours and 46 minutes. Therapy based AHI is 2.5/hr on 22/12 cm H2O. The patient is experiencing benefit from PAP therapy and should continue to use. If she continues to lose weight, can repeat sleep study next visit. She will follow up in 6 months or sooner if needed.

## 2025-04-07 NOTE — HISTORY OF PRESENT ILLNESS
[FreeTextEntry1] :  66-year-old female who presents for follow up of chronic hypercapnic respiratory failure in setting of WENDY/OHS.  Medical history incudes anxiety, breast cancer  (BRCA1+ ER/SC+ HER2- s/p lumpectomy/chemo/RT) s/p BSO , vulvar cancer 2018 s/p partial radical vulvectomy with closure status post chemo/RT CKD V, HFpEF (EF 58%).   PFT (2023): preBD: FEV1 1.51 (78%), FVC 1.93 (76%), FEV1/FVC 78%, postBD: FEV1 1.34 (70%), FVC 1.51 (60%), FEV1/FVC 89%, TLC 3.19 (74%), DLCO 11.7 (62%)  CT chest (2023) showed resolution of opacities and pleural effusions with no evidence of mediastinal lymphadenopathy.  Split study (8/3/2023) pre-.4/hr, normalized with bilevel 22/12 cm H2O with oxygen at 5LPM Oximetry (10/16/2023) with bilevel, no oxygen, T88% 0.3 minutes, T90 1.2%  Device: AirCurve 10 VAuto Settin/10 cm H2O Mask: Full Face Mask DME: [facility]  Wears BPAP every night. she reports benefit from use. Has not been receiving masks from [facility]. She is on Ozempic since January and has lost about 10 lbs.

## 2025-04-07 NOTE — PROCEDURE
[FreeTextEntry1] : Maskfitting Patient was fit with a ResMed N30, size small.  KASSIDY LOPEZ was comfortable with the fit.   He claims she has not gotten any supplies from [facility].  They are looking into why she has not gotten supplies. She would like it to be ordered right away.

## 2025-04-16 NOTE — HISTORY OF PRESENT ILLNESS
[FreeTextEntry1] : f/up for Type 2 diabetes mellitus   last A1c: 5.7%   Patient with past medical history as below, remarkable for breast ca, WENDY, stage IV CKD, CHF, vulvar ca s/p vulvectomy and chemo-RT. Hx of renal calculi and hydronephrosis s/p lithotripsy      Screening Ophthalmology: follows Microalbumin: Cr:  follows with nephrology  EGFR: 18     Current diabetic medication regimen (verified with patient):   ozempic 0.5mg Q weekly ( no significant GI effects reported)   pioglitazone stopped ( leg edema with CKD)         Obesity  On ozempic    Known history of the following:   Thyroid disease: No Heart disease: No Mood disorder: yes Hypertension: No Seizures: No Gall bladder disease: No Known diabetic retinopathy: No  Pancreatitis: No Urolithiasis: yes

## 2025-04-23 NOTE — HISTORY OF PRESENT ILLNESS
[FreeTextEntry1] : cervical stenosis  [de-identified] : 66-year-old female with PMH of DM, HLD, CKD, sarcoidosis, breast cancer 2005 s/p lumpectomy, chemo/radiation, vulvar carcinoma diagnosed in 2018 s/p partial radical vulvectomy followed by chemo and radiation. History of bilateral hand tremors responsive to beta blockers, as well as unsteady gait, numbness in the feet, chronic daily  headaches - seeing neurology. Per chart review, she has had multiple MRIs of the brain and MRA head/neck which were unremarkable except for the microvascular changes and cerebellar atrophy.   She was ordered for MRI of the cervical spine by her neurologist Dr. Vinay Arzola. Presents for neurosurgical evaluation of cervical stenosis at the request of Dr. Arzola - unclear to what extend this is contributing to her gait as her ataxia appears to stem more from her cerebellar atrophy. MRI shows multilevel cervical spondylosis, most prominent at C3-4 and C6-7. No abnormal cord signal.

## 2025-04-23 NOTE — PHYSICAL EXAM
[General Appearance - In No Acute Distress] : in no acute distress [General Appearance - Alert] : alert [Oriented To Time, Place, And Person] : oriented to person, place, and time [Motor Tone] : muscle tone was normal in all four extremities [Motor Strength] : muscle strength was normal in all four extremities [Sensation Tactile Decrease] : light touch was intact [FreeTextEntry8] : her balance is pretty off and dizzy when walking.

## 2025-04-23 NOTE — HISTORY OF PRESENT ILLNESS
[FreeTextEntry1] : cervical stenosis  [de-identified] : 66-year-old female with PMH of DM, HLD, CKD, sarcoidosis, breast cancer 2005 s/p lumpectomy, chemo/radiation, vulvar carcinoma diagnosed in 2018 s/p partial radical vulvectomy followed by chemo and radiation. History of bilateral hand tremors responsive to beta blockers, as well as unsteady gait, numbness in the feet, chronic daily  headaches - seeing neurology. Per chart review, she has had multiple MRIs of the brain and MRA head/neck which were unremarkable except for the microvascular changes and cerebellar atrophy.   She was ordered for MRI of the cervical spine by her neurologist Dr. Vinya Arzola. Presents for neurosurgical evaluation of cervical stenosis at the request of Dr. Arzola - unclear to what extend this is contributing to her gait as her ataxia appears to stem more from her cerebellar atrophy. MRI shows multilevel cervical spondylosis, most prominent at C3-4 and C6-7. No abnormal cord signal.

## 2025-04-23 NOTE — ASSESSMENT
[FreeTextEntry1] : 67 y/o F, with PMH of DM, HLD, CKD, sarcoidosis, breast cancer 2005 s/p lumpectomy, chemo/radiation, vulvar carcinoma diagnosed in 2018 s/p partial radical vulvectomy followed by chemo and radiation.  She has been seeing neurology for bilateral hand tremors responsive to BB, unsteady gait, numbness in the feet and chronic daily headaches.   Presents for neurosurgical evaluation of cervical stenosis at the request of Dr. Arzola. Dr. Foster reviewed the MRI and discussed with patient.  Her symptoms are most likely due to cerebellar atrophy. She should continue vestibular therapy and continue to follow up with Dr. Arzola.    Please see Dr. Foster's dictation for details. I, Dr. Jayden Foster evaluated the patient with the PA Rambo Mccormick and established the plan of care. I personally discuss this patient with the PA at the time of the visit. I agree with the assessment and plan as written, unless noted below.

## 2025-05-14 NOTE — VITALS
Post-Care Instructions: I reviewed with the patient in detail post-care instructions. Patient is to avoid sunlight for the next 2 days, and wear sun protection. Patients may expect sunburn like redness, discomfort and scabbing. [Maximal Pain Intensity: 0/10] : 0/10 [Least Pain Intensity: 0/10] : 0/10 [80: Normal activity with effort; some signs or symptoms of disease.] : 80: Normal activity with effort; some signs or symptoms of disease.

## 2025-05-21 NOTE — HISTORY OF PRESENT ILLNESS
[FreeTextEntry1] : Ms. Shelton is a 66-year-old woman seen today for follow-up visit. She was treated for [id number] vulvar cancer s/p partial radical vulvectomy, LVSI+, DOI 1.0 cm. 1 mm yanci-medial margin, 7 mm deep margin, and 1/10 inguinal LNs (+) 4 mm with TOMY, with local recurrence prior to RT, and then s/p 5940 RT to vulva and groins completed in January 2019 with clitoral lesions persisting through treatment, and subsequent radical anterior vulvectomy with primary closure 4/22/19.   She also has a history of high-grade DCIS of the left breast s/p lumpectomy in January 2023, DCISRT 9.2 with antecedent history of ipsilateral ER/HI (+), HER 2- (-), poorly differentiated left breast cancer treated with Breast conserving surgery w/ axillary dissection, adjuvant chemotherapy and radiation therapy in 2005 followed by tamoxifen/femara. S/p BSO in 2010 with negative pathology.   4/17/24: She presents today for follow-up. She has a persistent cough, following with pulmonary and ENT. No breast pain. Denies abdominal or pelvic pain, diarrhea or constipation, vaginal bleeding or discharge. Main complaint is persistent urinary leakage, for which she wears Depends. No dysuria. She saw urology Dr Dumont and had recent cystoscopy.   5/8/2024: MRI pelvis: mixed T2 hypointense and intermediate signal of the left vulvar region with progressive enhancement, suggestive of posttreatment changes. In addition, there is an indeterminant T2 signal lesion distal to the vaginal introitus and near the distal urethra without diffusion restriction or abnormal enhancement. Findings felt to represent posttreatment changes.   6/3/2024- MRI Brain, MRA brain and neck for persistent headaches- no evidence of acute infarct, large vessel occlusion, or aneurysm. No hemodynamically significant stenosis of the bilateral cervical ICAs by NASCET criteria.   6/10/24 Mammo/sono: No evidence of malignancy. Bi-Rads 2.   11/7/24: She presents today for follow-up. Continues following with Dr. Mancilla, saw him earlier this week. She reports feeling a persistent lump in the left breast, unchanged for years, likely scar tissue. Next mammo/sono in June.  She also follows with Dr. Dumont for urinary incontinence, now with sacral neuromodulation implant device, which is helping somewhat. Still with urinary leakage, wears Depends.  No abdominal or pelvic pain, no vaginal bleeding, no diarrhea or constipation. Has not seen Dr. Gordillo.   11/26/24 MR Pelvis: Post treatment changes within the left vulvar without evidence of local recurrence.  Interval decrease in size of the T2 intermediate structure along the posterior aspect of the left urethra with questionable focal restricted diffusion. Correlation with direct visual dilatation is suggested.  5/15/25: Presents for follow-up. Doing well, lost 15 lbs on Ozempic.  She reports feeling a persistent lump in the left breast, unchanged for years, likely scar tissue. No pain. Next mammo/sono in June.  She also follows with Dr. Dumont for urinary incontinence, now with sacral neuromodulation implant device, which is helpful. No bowel complaints, no abd/pelvis pain.  Following with neuro for cervical stenosis and headache, recent botox injections.

## 2025-05-21 NOTE — HISTORY OF PRESENT ILLNESS
[FreeTextEntry1] : Ms. Shelton is a 66-year-old woman seen today for follow-up visit. She was treated for [id number] vulvar cancer s/p partial radical vulvectomy, LVSI+, DOI 1.0 cm. 1 mm yanci-medial margin, 7 mm deep margin, and 1/10 inguinal LNs (+) 4 mm with TOMY, with local recurrence prior to RT, and then s/p 5940 RT to vulva and groins completed in January 2019 with clitoral lesions persisting through treatment, and subsequent radical anterior vulvectomy with primary closure 4/22/19.   She also has a history of high-grade DCIS of the left breast s/p lumpectomy in January 2023, DCISRT 9.2 with antecedent history of ipsilateral ER/NM (+), HER 2- (-), poorly differentiated left breast cancer treated with Breast conserving surgery w/ axillary dissection, adjuvant chemotherapy and radiation therapy in 2005 followed by tamoxifen/femara. S/p BSO in 2010 with negative pathology.   4/17/24: She presents today for follow-up. She has a persistent cough, following with pulmonary and ENT. No breast pain. Denies abdominal or pelvic pain, diarrhea or constipation, vaginal bleeding or discharge. Main complaint is persistent urinary leakage, for which she wears Depends. No dysuria. She saw urology Dr Dumont and had recent cystoscopy.   5/8/2024: MRI pelvis: mixed T2 hypointense and intermediate signal of the left vulvar region with progressive enhancement, suggestive of posttreatment changes. In addition, there is an indeterminant T2 signal lesion distal to the vaginal introitus and near the distal urethra without diffusion restriction or abnormal enhancement. Findings felt to represent posttreatment changes.   6/3/2024- MRI Brain, MRA brain and neck for persistent headaches- no evidence of acute infarct, large vessel occlusion, or aneurysm. No hemodynamically significant stenosis of the bilateral cervical ICAs by NASCET criteria.   6/10/24 Mammo/sono: No evidence of malignancy. Bi-Rads 2.   11/7/24: She presents today for follow-up. Continues following with Dr. Mancilla, saw him earlier this week. She reports feeling a persistent lump in the left breast, unchanged for years, likely scar tissue. Next mammo/sono in June.  She also follows with Dr. Dumont for urinary incontinence, now with sacral neuromodulation implant device, which is helping somewhat. Still with urinary leakage, wears Depends.  No abdominal or pelvic pain, no vaginal bleeding, no diarrhea or constipation. Has not seen Dr. Gordillo.   11/26/24 MR Pelvis: Post treatment changes within the left vulvar without evidence of local recurrence.  Interval decrease in size of the T2 intermediate structure along the posterior aspect of the left urethra with questionable focal restricted diffusion. Correlation with direct visual dilatation is suggested.  5/15/25: Presents for follow-up. Doing well, lost 15 lbs on Ozempic.  She reports feeling a persistent lump in the left breast, unchanged for years, likely scar tissue. No pain. Next mammo/sono in June.  She also follows with Dr. Dumont for urinary incontinence, now with sacral neuromodulation implant device, which is helpful. No bowel complaints, no abd/pelvis pain.  Following with neuro for cervical stenosis and headache, recent botox injections.

## 2025-05-21 NOTE — PHYSICAL EXAM
[Normal] : well developed, well nourished, in no acute distress [] : no respiratory distress [Respiration, Rhythm And Depth] : normal respiratory rhythm and effort [Abdomen Soft] : soft [No Focal Deficits] : no focal deficits [Oriented To Time, Place, And Person] : oriented to person, place, and time [Affect] : the affect was normal [de-identified] : surgical and radiation changes of vulva vaginal and rectal examination deferred by pt

## 2025-05-21 NOTE — PHYSICAL EXAM
[Normal] : well developed, well nourished, in no acute distress [] : no respiratory distress [Respiration, Rhythm And Depth] : normal respiratory rhythm and effort [Abdomen Soft] : soft [No Focal Deficits] : no focal deficits [Oriented To Time, Place, And Person] : oriented to person, place, and time [Affect] : the affect was normal [de-identified] : surgical and radiation changes of vulva vaginal and rectal examination deferred by pt

## 2025-06-10 NOTE — END OF VISIT
[FreeTextEntry3] : Per Dr. Chi's note [] : Fellow [Time Spent: ___ minutes] : I have spent [unfilled] minutes of time on the encounter which excludes teaching and separately reported services.

## 2025-06-10 NOTE — END OF VISIT
[] : Fellow [FreeTextEntry3] : Per Dr. Chi's note [Time Spent: ___ minutes] : I have spent [unfilled] minutes of time on the encounter which excludes teaching and separately reported services.

## 2025-06-12 NOTE — PHYSICAL EXAM
Telephone Encounter by Lilliana Cisse at 02/09/18 01:52 PM     Author:  Lilliana Cisse Service:  (none) Author Type:       Filed:  02/09/18 01:56 PM Encounter Date:  2/9/2018 Status:  Signed     :  Lilliana Cisse ()              ZOYA FIGUEROA    Patient Age: 67 year old    ACCT STATUS:   MESSAGE:[MB1.1T]  This patient has new insurance.  They can now only use the [facility] on [address] for their medications.  When his[MB1.1M] metoprolol (TOPROL XL) 50 MG 24 hr tablet[MB1.1T] and[MB1.1M] simvastatin (ZOCOR) 40 MG tablet[MB1.1T] is due for refill the end of February early March it will be sent to [facility].[MB1.1M]   Next and Last Visit with Provider and Department  Next visit with SONIA BULLOCK is on No match found  Next visit with [facility] is on No match found  Last visit with SONIA BULLOCK was on 01/24/2018 at 10:50 AM in [facility]  Last visit with [facility] was on 01/24/2018 at 10:50 AM in [facility]     WEIGHT AND HEIGHT: As of 01/24/2018 weight is 171 lbs.(77.565 kg). Height is 5' 9\"(1.753 m).   BMI is 25.24 kg/(m^2) calculated from:     Height 5' 9\" (1.753 m) as of 1/24/18     Weight 171 lb (77.565 kg) as of 1/24/18[MB1.1T]      No Known Allergies[MB1.2T]  Current outpatient prescriptions       Medication  Sig Dispense Refill   • metoprolol (TOPROL XL) 50 MG 24 hr tablet Take 1 Tab by mouth 2 (two) times daily. 180 Tab 3   • simvastatin (ZOCOR) 40 MG tablet Take 1 Tab by mouth nightly. at bedtime 90 Tab 3   • Rivaroxaban (XARELTO) 20 MG TABS Take 1 Tab by mouth daily. 90 Each 3   • MULTIVITAMIN OR 1 daily     • FISH OIL 1200 MG OR CAPS 1 daily        [facility]ACY to [facility]MB1.1T[facility][MB1.1M[facility]          Pharmacy preference(s) on file: [facility]    CALL BACK INFO:[MB1.1T] Ok to leave response (including medical information) on answering machine[MB1.1M]  ROUTING:[MB1.1T] Patient's  physician/staff[MB1.1M]        PCP: Lobo Moss MD         INS: Payor: MEDICARE - ASSIGNED / Plan: *No Plan* / Product Type: *No Product type* / Note: This is the primary coverage, but no account was found for this location or the patient's primary location.   ADDRESS:  [address] [place] [id number][MB1.1T]       Revision History        User Key Date/Time User Provider Type Action    > MB1.2 02/09/18 01:56 PM Lilliana Cisse  Sign     MB1.1 02/09/18 01:52 PM Lilliana Cisse      M - Manual, T - Template             [General Appearance - Alert] : alert [General Appearance - In No Acute Distress] : in no acute distress [General Appearance - Well Nourished] : well nourished [Neck Cervical Mass (___cm)] : no neck mass was observed [Jugular Venous Distention Increased] : there was no jugular-venous distention [Respiration, Rhythm And Depth] : normal respiratory rhythm and effort [] : no respiratory distress [Exaggerated Use Of Accessory Muscles For Inspiration] : no accessory muscle use [Auscultation Breath Sounds / Voice Sounds] : lungs were clear to auscultation bilaterally [Heart Rate And Rhythm] : heart rate was normal and rhythm regular [Heart Sounds] : normal S1 and S2 [Heart Sounds Gallop] : no gallops [Murmurs] : no murmurs [Heart Sounds Pericardial Friction Rub] : no pericardial rub [Abdomen Soft] : soft [FreeTextEntry1] : ++LE edema [Abdomen Tenderness] : non-tender [No CVA Tenderness] : no ~M costovertebral angle tenderness [Abnormal Walk] : normal gait [Skin Color & Pigmentation] : normal skin color and pigmentation [Involuntary Movements] : no involuntary movements were seen [No Focal Deficits] : no focal deficits [Oriented To Time, Place, And Person] : oriented to person, place, and time [Impaired Insight] : insight and judgment were intact [Affect] : the affect was normal [Mood] : the mood was normal [Edema] : there was no peripheral edema

## 2025-06-12 NOTE — PHYSICAL EXAM
[General Appearance - Alert] : alert [General Appearance - Well Nourished] : well nourished [General Appearance - In No Acute Distress] : in no acute distress [Neck Cervical Mass (___cm)] : no neck mass was observed [Jugular Venous Distention Increased] : there was no jugular-venous distention [] : no respiratory distress [Respiration, Rhythm And Depth] : normal respiratory rhythm and effort [Auscultation Breath Sounds / Voice Sounds] : lungs were clear to auscultation bilaterally [Exaggerated Use Of Accessory Muscles For Inspiration] : no accessory muscle use [Heart Rate And Rhythm] : heart rate was normal and rhythm regular [Heart Sounds] : normal S1 and S2 [Heart Sounds Gallop] : no gallops [Murmurs] : no murmurs [Heart Sounds Pericardial Friction Rub] : no pericardial rub [Abdomen Soft] : soft [FreeTextEntry1] : ++LE edema [Abdomen Tenderness] : non-tender [No CVA Tenderness] : no ~M costovertebral angle tenderness [Abnormal Walk] : normal gait [Involuntary Movements] : no involuntary movements were seen [Skin Color & Pigmentation] : normal skin color and pigmentation [No Focal Deficits] : no focal deficits [Oriented To Time, Place, And Person] : oriented to person, place, and time [Impaired Insight] : insight and judgment were intact [Affect] : the affect was normal [Mood] : the mood was normal [Edema] : there was no peripheral edema

## 2025-06-12 NOTE — PHYSICAL EXAM
[General Appearance - Alert] : alert [General Appearance - Well Nourished] : well nourished [General Appearance - In No Acute Distress] : in no acute distress [Neck Cervical Mass (___cm)] : no neck mass was observed [Jugular Venous Distention Increased] : there was no jugular-venous distention [Respiration, Rhythm And Depth] : normal respiratory rhythm and effort [] : no respiratory distress [Exaggerated Use Of Accessory Muscles For Inspiration] : no accessory muscle use [Auscultation Breath Sounds / Voice Sounds] : lungs were clear to auscultation bilaterally [Heart Rate And Rhythm] : heart rate was normal and rhythm regular [Heart Sounds] : normal S1 and S2 [Heart Sounds Gallop] : no gallops [Murmurs] : no murmurs [Heart Sounds Pericardial Friction Rub] : no pericardial rub [FreeTextEntry1] : ++LE edema [Abdomen Soft] : soft [Abdomen Tenderness] : non-tender [No CVA Tenderness] : no ~M costovertebral angle tenderness [Abnormal Walk] : normal gait [Skin Color & Pigmentation] : normal skin color and pigmentation [Involuntary Movements] : no involuntary movements were seen [No Focal Deficits] : no focal deficits [Oriented To Time, Place, And Person] : oriented to person, place, and time [Impaired Insight] : insight and judgment were intact [Affect] : the affect was normal [Mood] : the mood was normal [Edema] : there was no peripheral edema

## 2025-06-13 NOTE — HISTORY OF PRESENT ILLNESS
[FreeTextEntry1] : Ms. Shelton is a 65 y/o female with PMhx DM, hyperlipidemia, breast cancer, sarcoidosis, vulvar carcinoma and CKD4 presenting today for follow up.   She was diagnosed with vulvar Ca in Sep /2018 and underwent a partial radical vulvectomy in October. She received 6 cycles of chemo ( Cisplatin) and 37 cycles of radiation in Dec/early Jan. Underwent radical vulvectomy with closure in april 2019. Also, with H/O BRCA1+ ER/AR (+), HER 2- (-) poorly differentiated left breast cancer in 2005, s/p lumpectomy w/ axillary dissection, chemotherapy, RT and tamoxifen/femara, s/p BSO in 2010 with negative pathology.    Early in 2020, she had JONI in the setting of a stone and hydronephrosis. She underwent Lithotripsy with return of her creatinine to the 2 mg/dl range. She was admitted to [facility] in June of 2020 with pyelopnephriItis( E Coli) , JONI and severe anemia. Her peak creatinine during this admission was 4.5 mg/dl and was down to 2.5 mg/dl at the time of discharge. Of note, a ct scan done during this time revealed an atrophic Left kidney.  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 No further treatment ie lumpectomy/radiation etc have been pursued so far.   She was hospitalized in February (2023)for a nervous breakdown due to her cancer diagnosis. Her serum creatinine during this time ranged between 2.6-3.3 mg/dl. Readmitted again with SOB attributed to volume overload, which improved with diuresis. She underwent a CT chest which revealed pulmonary edema and trace bilateral pleural effusions. She also underwent a repeat TTE which showed moderate  diastolic dysfunction ( stage 11). Normal LV/RV  systolic function , EF 56%. DC on home O2. . Her kidney function during this time fluctuated. Serum creatinine in march has ranged between 3.1-3.8 mg/dl.   She underwent a renal sono in the hospital: Right kidney: 9.6 cm. No renal mass, hydronephrosis or calculi. Left kidney: 8.6 cm. No renal mass, hydronephrosis or calculi., Urinary bladder: Within normal limits.  6/2024: She comes for a follow up visit today. Bladder botox not helpful, still with urinary incontinence. Now taking diuretics BID, which has improved her LE edema. BP has been good. Still with postural dizziness. Planning for a urologic procedure next week for incontinence. Was canceled prior due to hyperglycemia. Saw endocrine who started her on Ozempic and Januvia (stated Tradjenta was too expensive). Did lose some weight with ozempic. Last A1c ~8%. Obtained cardiology clearance as well, had 2D echo in May that was normal. sees a psychiatrist once a month and therapy 2 times a week   9/18/24: Presents for follow-up with her . Lost insurance drug coverage so has stopped taking Ozempic for the past couple months and also decreased Ethacrynic acid to once daily to ration her supply. She stated she is otherwise doing well. Had procedure for urinary incontinence which has helped a bit.  June: Now on OZempic. Has lost weight Also receiving Botox injections sugars well controlled tolerating torsemide f/u with cardiology next stephanie

## 2025-06-13 NOTE — ASSESSMENT
[FreeTextEntry1] : Ms Shelton is a 66 y/o woman with remote H/O sarcoidosis, breast cancer, diagnosed Vulvar cancer in 2018 s/p vulvectomy/Chemotherapy ( Cisplatin) /Radiation, recently diagnosed left Breast Ca /2023( DCIS) s/p lumpectomy, Diastolic dysfunction, DM and CKD here for follow up  #CKD (stage 4) with : +Left atrophic kidney - Her CKD is likely as a result of tubular toxicity from cisplatin that she received in 2019, with possibly a component from NSAIDS.  Her serum creatinine over the last year ( June 2024) has been ranging in the 3 - 3.4 mg/dL range  -Kidney function stable.  Electrolytes in range - US duplex negative for CELI - She had a few hospitalizations in 2023  with fluid overload requiring diuresis, found to have diastolic dysfunction but most recent echo in May was normal.    - Recommended to continue avoidance of NSAIDs - Enrolled in HT program   #HTN - BP at goal - Maintain amlodipine. Since ethacrynic acid  was expensive for pt, she was started on torsemide 20mg at the last visit in February.  Now with no edema  - Patient to continue checking BP at home - low salt diet.   #anemia - hg at goal - Maintain PO iron supplements.  Iron stores are replete  #BMD - Calcium and phosphorus in range.  Vitamin D replete.  PTH is elevated but stable - Maintain calcitriol 0.25 mcg 5 days a week. She ran out of her meds a few weeks back.  She needs a refill  D/W her RTC in 4 months.

## 2025-06-13 NOTE — HISTORY OF PRESENT ILLNESS
[FreeTextEntry1] : Ms. Shelton is a 65 y/o female with PMhx DM, hyperlipidemia, breast cancer, sarcoidosis, vulvar carcinoma and CKD4 presenting today for follow up.   She was diagnosed with vulvar Ca in Sep /2018 and underwent a partial radical vulvectomy in October. She received 6 cycles of chemo ( Cisplatin) and 37 cycles of radiation in Dec/early Jan. Underwent radical vulvectomy with closure in april 2019. Also, with H/O BRCA1+ ER/IA (+), HER 2- (-) poorly differentiated left breast cancer in 2005, s/p lumpectomy w/ axillary dissection, chemotherapy, RT and tamoxifen/femara, s/p BSO in 2010 with negative pathology.    Early in 2020, she had JONI in the setting of a stone and hydronephrosis. She underwent Lithotripsy with return of her creatinine to the 2 mg/dl range. She was admitted to [facility] in June of 2020 with pyelopnephriItis( E Coli) , JONI and severe anemia. Her peak creatinine during this admission was 4.5 mg/dl and was down to 2.5 mg/dl at the time of discharge. Of note, a ct scan done during this time revealed an atrophic Left kidney.  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 No further treatment ie lumpectomy/radiation etc have been pursued so far.   She was hospitalized in February (2023)for a nervous breakdown due to her cancer diagnosis. Her serum creatinine during this time ranged between 2.6-3.3 mg/dl. Readmitted again with SOB attributed to volume overload, which improved with diuresis. She underwent a CT chest which revealed pulmonary edema and trace bilateral pleural effusions. She also underwent a repeat TTE which showed moderate  diastolic dysfunction ( stage 11). Normal LV/RV  systolic function , EF 56%. DC on home O2. . Her kidney function during this time fluctuated. Serum creatinine in march has ranged between 3.1-3.8 mg/dl.   She underwent a renal sono in the hospital: Right kidney: 9.6 cm. No renal mass, hydronephrosis or calculi. Left kidney: 8.6 cm. No renal mass, hydronephrosis or calculi., Urinary bladder: Within normal limits.  6/2024: She comes for a follow up visit today. Bladder botox not helpful, still with urinary incontinence. Now taking diuretics BID, which has improved her LE edema. BP has been good. Still with postural dizziness. Planning for a urologic procedure next week for incontinence. Was canceled prior due to hyperglycemia. Saw endocrine who started her on Ozempic and Januvia (stated Tradjenta was too expensive). Did lose some weight with ozempic. Last A1c ~8%. Obtained cardiology clearance as well, had 2D echo in May that was normal. sees a psychiatrist once a month and therapy 2 times a week   9/18/24: Presents for follow-up with her . Lost insurance drug coverage so has stopped taking Ozempic for the past couple months and also decreased Ethacrynic acid to once daily to ration her supply. She stated she is otherwise doing well. Had procedure for urinary incontinence which has helped a bit.  June: Now on OZempic. Has lost weight Also receiving Botox injections sugars well controlled tolerating torsemide f/u with cardiology next stephanie

## 2025-06-13 NOTE — REVIEW OF SYSTEMS
[Incontinence] : incontinence [Anxiety] : anxiety [Negative] : Neurological [Recent Weight Loss (___ Lbs)] : recent [unfilled] ~Ulb weight loss [Fever] : no fever [Chills] : no chills [Feeling Poorly] : not feeling poorly [Feeling Tired] : not feeling tired [Recent Weight Gain (___ Lbs)] : no recent weight gain [Heart Rate Is Slow] : the heart rate was not slow [Heart Rate Is Fast] : the heart rate was not fast [Lower Ext Edema] : no lower extremity edema [Feelings Of Weakness] : no feelings of weakness [Easy Bleeding] : no tendency for easy bleeding [Easy Bruising] : no tendency for easy bruising [FreeTextEntry3] : allergies [FreeTextEntry8] : Improved

## 2025-06-13 NOTE — HISTORY OF PRESENT ILLNESS
[FreeTextEntry1] : Ms. Shelton is a 65 y/o female with PMhx DM, hyperlipidemia, breast cancer, sarcoidosis, vulvar carcinoma and CKD4 presenting today for follow up.   She was diagnosed with vulvar Ca in Sep /2018 and underwent a partial radical vulvectomy in October. She received 6 cycles of chemo ( Cisplatin) and 37 cycles of radiation in Dec/early Jan. Underwent radical vulvectomy with closure in april 2019. Also, with H/O BRCA1+ ER/OR (+), HER 2- (-) poorly differentiated left breast cancer in 2005, s/p lumpectomy w/ axillary dissection, chemotherapy, RT and tamoxifen/femara, s/p BSO in 2010 with negative pathology.    Early in 2020, she had JONI in the setting of a stone and hydronephrosis. She underwent Lithotripsy with return of her creatinine to the 2 mg/dl range. She was admitted to [facility] in June of 2020 with pyelopnephriItis( E Coli) , JONI and severe anemia. Her peak creatinine during this admission was 4.5 mg/dl and was down to 2.5 mg/dl at the time of discharge. Of note, a ct scan done during this time revealed an atrophic Left kidney.  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 No further treatment ie lumpectomy/radiation etc have been pursued so far.   She was hospitalized in February (2023)for a nervous breakdown due to her cancer diagnosis. Her serum creatinine during this time ranged between 2.6-3.3 mg/dl. Readmitted again with SOB attributed to volume overload, which improved with diuresis. She underwent a CT chest which revealed pulmonary edema and trace bilateral pleural effusions. She also underwent a repeat TTE which showed moderate  diastolic dysfunction ( stage 11). Normal LV/RV  systolic function , EF 56%. DC on home O2. . Her kidney function during this time fluctuated. Serum creatinine in march has ranged between 3.1-3.8 mg/dl.   She underwent a renal sono in the hospital: Right kidney: 9.6 cm. No renal mass, hydronephrosis or calculi. Left kidney: 8.6 cm. No renal mass, hydronephrosis or calculi., Urinary bladder: Within normal limits.  6/2024: She comes for a follow up visit today. Bladder botox not helpful, still with urinary incontinence. Now taking diuretics BID, which has improved her LE edema. BP has been good. Still with postural dizziness. Planning for a urologic procedure next week for incontinence. Was canceled prior due to hyperglycemia. Saw endocrine who started her on Ozempic and Januvia (stated Tradjenta was too expensive). Did lose some weight with ozempic. Last A1c ~8%. Obtained cardiology clearance as well, had 2D echo in May that was normal. sees a psychiatrist once a month and therapy 2 times a week   9/18/24: Presents for follow-up with her . Lost insurance drug coverage so has stopped taking Ozempic for the past couple months and also decreased Ethacrynic acid to once daily to ration her supply. She stated she is otherwise doing well. Had procedure for urinary incontinence which has helped a bit.  June: Now on OZempic. Has lost weight Also receiving Botox injections sugars well controlled tolerating torsemide f/u with cardiology next stephanie

## 2025-06-13 NOTE — ASSESSMENT
[FreeTextEntry1] : Ms Shelton is a 68 y/o woman with remote H/O sarcoidosis, breast cancer, diagnosed Vulvar cancer in 2018 s/p vulvectomy/Chemotherapy ( Cisplatin) /Radiation, recently diagnosed left Breast Ca /2023( DCIS) s/p lumpectomy, Diastolic dysfunction, DM and CKD here for follow up  #CKD (stage 4) with : +Left atrophic kidney - Her CKD is likely as a result of tubular toxicity from cisplatin that she received in 2019, with possibly a component from NSAIDS.  Her serum creatinine over the last year ( June 2024) has been ranging in the 3 - 3.4 mg/dL range  -Kidney function stable.  Electrolytes in range - US duplex negative for CELI - She had a few hospitalizations in 2023  with fluid overload requiring diuresis, found to have diastolic dysfunction but most recent echo in May was normal.    - Recommended to continue avoidance of NSAIDs - Enrolled in HT program   #HTN - BP at goal - Maintain amlodipine. Since ethacrynic acid  was expensive for pt, she was started on torsemide 20mg at the last visit in February.  Now with no edema  - Patient to continue checking BP at home - low salt diet.   #anemia - hg at goal - Maintain PO iron supplements.  Iron stores are replete  #BMD - Calcium and phosphorus in range.  Vitamin D replete.  PTH is elevated but stable - Maintain calcitriol 0.25 mcg 5 days a week. She ran out of her meds a few weeks back.  She needs a refill  D/W her RTC in 4 months.

## 2025-07-18 NOTE — HISTORY OF PRESENT ILLNESS
[FreeTextEntry1] : CPE dizziness [de-identified] : 67yo F with pmh significant for htn, ckd, CHF, DCIS, t2dm, obesity, lizz, vulvar ca, presents for annual well visit. still complaining of dizziness. states it mostly happens when she stands up from sitting down. no CP, no SOB. does have h/o headaches for which she is seeing neuro and also saw back and spine.

## 2025-07-18 NOTE — HEALTH RISK ASSESSMENT
[Fair] :  ~his/her~ mood as fair [No] : In the past 12 months have you used drugs other than those required for medical reasons? No [No falls in past year] : Patient reported no falls in the past year [Medical reason not done] : Medical reason not done [Cost] : cost [Yes] : Reviewed medication list for presence of high-risk medications. [Never] : Never [NO] : No [With Significant Other] : lives with significant other [# of Members in Household ___] :  household currently consist of [unfilled] member(s) [Retired] : retired [College] : College [] :  [# Of Children ___] : has [unfilled] children [Feels Safe at Home] : Feels safe at home [Fully functional (bathing, dressing, toileting, transferring, walking, feeding)] : Fully functional (bathing, dressing, toileting, transferring, walking, feeding) [Fully functional (using the telephone, shopping, preparing meals, housekeeping, doing laundry, using] : Fully functional and needs no help or supervision to perform IADLs (using the telephone, shopping, preparing meals, housekeeping, doing laundry, using transportation, managing medications and managing finances) [Smoke Detector] : smoke detector [Carbon Monoxide Detector] : carbon monoxide detector [None] : None [de-identified] : nephro, neuro, endo, radonc, breast sx, uro,  [Audit-CScore] : 0 [de-identified] : sedentary  [de-identified] : no salt diet  [de-identified] : dx of depression anxiety, following with psych  [Change in mental status noted] : No change in mental status noted [Language] : denies difficulty with language [Behavior] : denies difficulty with behavior [Learning/Retaining New Information] : denies difficulty learning/retaining new information [Handling Complex Tasks] : denies difficulty handling complex tasks [Reasoning] : denies difficulty with reasoning [Spatial Ability and Orientation] : denies difficulty with spatial ability and orientation [Sexually Active] : not sexually active [High Risk Behavior] : no high risk behavior [Reports changes in hearing] : Reports no changes in hearing [Reports changes in vision] : Reports no changes in vision [Reports normal functional visual acuity (ie: able to read med bottle)] : Reports poor functional visual acuity.  [Reports changes in dental health] : Reports no changes in dental health [MammogramDate] : 06/2025 [HIVDate] : 2024 [HepatitisCDate] : 2024 [de-identified] : unable to read the bottles

## 2025-07-18 NOTE — PHYSICAL EXAM
[EOMI] : extraocular movements intact [Supple] : supple [Clear to Auscultation] : lungs were clear to auscultation bilaterally [Normal Rate] : normal rate  [Regular Rhythm] : with a regular rhythm [No Edema] : there was no peripheral edema [Soft] : abdomen soft [Non Tender] : non-tender [No Focal Deficits] : no focal deficits [Normal Insight/Judgement] : insight and judgment were intact [de-identified] : obese